# Patient Record
Sex: FEMALE | Race: BLACK OR AFRICAN AMERICAN | NOT HISPANIC OR LATINO | URBAN - METROPOLITAN AREA
[De-identification: names, ages, dates, MRNs, and addresses within clinical notes are randomized per-mention and may not be internally consistent; named-entity substitution may affect disease eponyms.]

---

## 2024-03-13 ENCOUNTER — OFFICE VISIT (OUTPATIENT)
Dept: OBSTETRICS AND GYNECOLOGY | Facility: HOSPITAL | Age: 29
End: 2024-03-13
Payer: COMMERCIAL

## 2024-03-13 VITALS
WEIGHT: 153 LBS | HEIGHT: 64 IN | SYSTOLIC BLOOD PRESSURE: 118 MMHG | BODY MASS INDEX: 26.12 KG/M2 | DIASTOLIC BLOOD PRESSURE: 79 MMHG

## 2024-03-13 DIAGNOSIS — Z30.011 ENCOUNTER FOR INITIAL PRESCRIPTION OF CONTRACEPTIVE PILLS: Primary | ICD-10-CM

## 2024-03-13 DIAGNOSIS — Z01.419 WELL WOMAN EXAM: ICD-10-CM

## 2024-03-13 PROCEDURE — 1036F TOBACCO NON-USER: CPT | Performed by: NURSE PRACTITIONER

## 2024-03-13 PROCEDURE — 99395 PREV VISIT EST AGE 18-39: CPT | Performed by: NURSE PRACTITIONER

## 2024-03-13 PROCEDURE — 88175 CYTOPATH C/V AUTO FLUID REDO: CPT | Mod: TC,GCY | Performed by: NURSE PRACTITIONER

## 2024-03-13 PROCEDURE — 88141 CYTOPATH C/V INTERPRET: CPT | Performed by: STUDENT IN AN ORGANIZED HEALTH CARE EDUCATION/TRAINING PROGRAM

## 2024-03-13 RX ORDER — DESOGESTREL AND ETHINYL ESTRADIOL 0.15-0.03
1 KIT ORAL DAILY
Qty: 28 TABLET | Refills: 3 | Status: SHIPPED | OUTPATIENT
Start: 2024-03-13 | End: 2025-03-13

## 2024-03-13 ASSESSMENT — PATIENT HEALTH QUESTIONNAIRE - PHQ9
SUM OF ALL RESPONSES TO PHQ9 QUESTIONS 1 AND 2: 0
1. LITTLE INTEREST OR PLEASURE IN DOING THINGS: NOT AT ALL
2. FEELING DOWN, DEPRESSED OR HOPELESS: NOT AT ALL

## 2024-03-13 ASSESSMENT — ENCOUNTER SYMPTOMS
ENDOCRINE NEGATIVE: 1
HEMATOLOGIC/LYMPHATIC NEGATIVE: 1
CARDIOVASCULAR NEGATIVE: 1
PSYCHIATRIC NEGATIVE: 1
EYES NEGATIVE: 1
NEUROLOGICAL NEGATIVE: 1
ALLERGIC/IMMUNOLOGIC NEGATIVE: 1
GASTROINTESTINAL NEGATIVE: 1
RESPIRATORY NEGATIVE: 1
MUSCULOSKELETAL NEGATIVE: 1
CONSTITUTIONAL NEGATIVE: 1

## 2024-03-13 ASSESSMENT — PAIN SCALES - GENERAL: PAINLEVEL: 0-NO PAIN

## 2024-03-13 NOTE — PROGRESS NOTES
Ansley Nichols, APRN-CNP     Subjective   Ginny Lerma is a 28 y.o. female who presents for annual exam.   Patient is a 28-year-old G0, P0 here today for her annual checkup and to discuss birth control options.  History was reviewed and updated.    Patient was here a year ago discussing options for birth control.  At that time birth control pills were ordered but patient never took them as she was fearful of the side effects.  At this time side effects were again reviewed with the patient including protection against ovarian cancer, regulation of menstrual cycles improvement of skin.  Patient is a non-smoker under the age of 35 without a personal history of blood clots or migraines with aura.  She is normotensive.    At this time she is willing to start the birth control pills on  as she is currently on her menstrual period she and her partner have been using the withdrawal method and are anxious for better protection.  Patient is agreeable to come back after 3 months for blood pressure check and compliance check.  History reviewed. No pertinent past medical history.  Past Surgical History:   Procedure Laterality Date    BREAST LUMPECTOMY Left     benign    TONSILECTOMY, ADENOIDECTOMY, BILATERAL MYRINGOTOMY AND TUBES         OB History          0    Para   0    Term   0       0    AB   0    Living   0         SAB   0    IAB   0    Ectopic   0    Multiple   0    Live Births   0               Patient's last menstrual period was 03/10/2024 (approximate).      Review of Systems   Constitutional: Negative.    HENT: Negative.     Eyes: Negative.    Respiratory: Negative.     Cardiovascular: Negative.    Gastrointestinal: Negative.    Endocrine: Negative.    Genitourinary: Negative.    Musculoskeletal: Negative.    Skin: Negative.    Allergic/Immunologic: Negative.    Neurological: Negative.    Hematological: Negative.    Psychiatric/Behavioral: Negative.     All other systems reviewed and are  "negative.    Breast: No Complaints   Vaginal: No Complaints        Objective   /79 (BP Location: Right arm)   Ht 1.626 m (5' 4\")   Wt 69.4 kg (153 lb)   LMP 03/10/2024 (Approximate)   BMI 26.26 kg/m²   Physical Exam  Genitourinary:      Right Labia: No tenderness or lesions.     Left Labia: No tenderness or lesions.     No vaginal discharge or tenderness.      No vaginal prolapse present.     No vaginal atrophy present.     No cervical motion tenderness.      Uterus is not enlarged.      Uterus is anteverted.   Breasts:     Right: Normal.      Left: Normal.                 Assessment/Plan   Problem List Items Addressed This Visit    None  Visit Diagnoses         Codes    Encounter for initial prescription of contraceptive pills    -  Primary Z30.011    Relevant Medications    desogestreL-ethinyl estradioL (Apri) 0.15-0.03 mg tablet    Other Relevant Orders    THINPREP PAP TEST    Follow Up In Gynecology    Well woman exam     Z01.419          Return in 3 months for blood pressure check      "

## 2024-03-15 ENCOUNTER — APPOINTMENT (OUTPATIENT)
Dept: PRIMARY CARE | Facility: CLINIC | Age: 29
End: 2024-03-15
Payer: COMMERCIAL

## 2024-03-25 LAB
CYTOLOGY CMNT CVX/VAG CYTO-IMP: NORMAL
LAB AP HPV GENOTYPE QUESTION: YES
LAB AP HPV HR: NORMAL
LABORATORY COMMENT REPORT: NORMAL
PATH REPORT.TOTAL CANCER: NORMAL

## 2024-06-06 ENCOUNTER — HOSPITAL ENCOUNTER (EMERGENCY)
Facility: HOSPITAL | Age: 29
Discharge: HOME | End: 2024-06-06
Attending: EMERGENCY MEDICINE
Payer: COMMERCIAL

## 2024-06-06 VITALS
RESPIRATION RATE: 15 BRPM | HEIGHT: 64 IN | DIASTOLIC BLOOD PRESSURE: 67 MMHG | WEIGHT: 153 LBS | SYSTOLIC BLOOD PRESSURE: 125 MMHG | BODY MASS INDEX: 26.12 KG/M2 | HEART RATE: 78 BPM | OXYGEN SATURATION: 99 %

## 2024-06-06 DIAGNOSIS — T78.40XA ALLERGIC REACTION TO DRUG, INITIAL ENCOUNTER: Primary | ICD-10-CM

## 2024-06-06 LAB
ANION GAP BLDV CALCULATED.4IONS-SCNC: 10 MMOL/L (ref 10–25)
BASE EXCESS BLDV CALC-SCNC: 0.8 MMOL/L (ref -2–3)
BODY TEMPERATURE: 37 DEGREES CELSIUS
CA-I BLDV-SCNC: 1.26 MMOL/L (ref 1.1–1.33)
CHLORIDE BLDV-SCNC: 105 MMOL/L (ref 98–107)
FLUAV RNA RESP QL NAA+PROBE: NOT DETECTED
FLUBV RNA RESP QL NAA+PROBE: DETECTED
GLUCOSE BLDV-MCNC: 87 MG/DL (ref 74–99)
HCO3 BLDV-SCNC: 27.4 MMOL/L (ref 22–26)
HCT VFR BLD EST: 34 % (ref 36–46)
HGB BLDV-MCNC: 11.2 G/DL (ref 12–16)
LACTATE BLDV-SCNC: 0.7 MMOL/L (ref 0.4–2)
OXYHGB MFR BLDV: 37.8 % (ref 45–75)
PCO2 BLDV: 52 MM HG (ref 41–51)
PH BLDV: 7.33 PH (ref 7.33–7.43)
PO2 BLDV: 32 MM HG (ref 35–45)
POTASSIUM BLDV-SCNC: 3.8 MMOL/L (ref 3.5–5.3)
PREGNANCY TEST URINE, POC: NEGATIVE
SAO2 % BLDV: 38 % (ref 45–75)
SARS-COV-2 RNA RESP QL NAA+PROBE: NOT DETECTED
SODIUM BLDV-SCNC: 139 MMOL/L (ref 136–145)

## 2024-06-06 PROCEDURE — 2500000001 HC RX 250 WO HCPCS SELF ADMINISTERED DRUGS (ALT 637 FOR MEDICARE OP): Performed by: EMERGENCY MEDICINE

## 2024-06-06 PROCEDURE — 87636 SARSCOV2 & INF A&B AMP PRB: CPT | Performed by: STUDENT IN AN ORGANIZED HEALTH CARE EDUCATION/TRAINING PROGRAM

## 2024-06-06 PROCEDURE — 99291 CRITICAL CARE FIRST HOUR: CPT | Mod: 25,27 | Performed by: EMERGENCY MEDICINE

## 2024-06-06 PROCEDURE — 99291 CRITICAL CARE FIRST HOUR: CPT | Performed by: EMERGENCY MEDICINE

## 2024-06-06 PROCEDURE — 84132 ASSAY OF SERUM POTASSIUM: CPT

## 2024-06-06 PROCEDURE — 96372 THER/PROPH/DIAG INJ SC/IM: CPT

## 2024-06-06 PROCEDURE — 96374 THER/PROPH/DIAG INJ IV PUSH: CPT

## 2024-06-06 PROCEDURE — 2500000004 HC RX 250 GENERAL PHARMACY W/ HCPCS (ALT 636 FOR OP/ED)

## 2024-06-06 PROCEDURE — 99284 EMERGENCY DEPT VISIT MOD MDM: CPT | Mod: 25

## 2024-06-06 PROCEDURE — 96375 TX/PRO/DX INJ NEW DRUG ADDON: CPT

## 2024-06-06 PROCEDURE — 81025 URINE PREGNANCY TEST: CPT | Performed by: STUDENT IN AN ORGANIZED HEALTH CARE EDUCATION/TRAINING PROGRAM

## 2024-06-06 RX ORDER — OXYMETAZOLINE HCL 0.05 %
2 SPRAY, NON-AEROSOL (ML) NASAL ONCE
Status: COMPLETED | OUTPATIENT
Start: 2024-06-06 | End: 2024-06-06

## 2024-06-06 RX ORDER — KETAMINE HYDROCHLORIDE 100 MG/ML
INJECTION, SOLUTION INTRAMUSCULAR; INTRAVENOUS
Status: DISCONTINUED
Start: 2024-06-06 | End: 2024-06-06 | Stop reason: HOSPADM

## 2024-06-06 RX ORDER — DIPHENHYDRAMINE HYDROCHLORIDE 50 MG/ML
50 INJECTION INTRAMUSCULAR; INTRAVENOUS ONCE
Status: COMPLETED | OUTPATIENT
Start: 2024-06-06 | End: 2024-06-06

## 2024-06-06 RX ORDER — LIDOCAINE HYDROCHLORIDE 40 MG/ML
1 SOLUTION TOPICAL ONCE
Status: COMPLETED | OUTPATIENT
Start: 2024-06-06 | End: 2024-06-06

## 2024-06-06 RX ORDER — ROCURONIUM BROMIDE 10 MG/ML
INJECTION, SOLUTION INTRAVENOUS
Status: DISCONTINUED
Start: 2024-06-06 | End: 2024-06-06 | Stop reason: HOSPADM

## 2024-06-06 RX ORDER — FAMOTIDINE 10 MG/ML
INJECTION INTRAVENOUS
Status: COMPLETED
Start: 2024-06-06 | End: 2024-06-06

## 2024-06-06 RX ORDER — DIPHENHYDRAMINE HCL 25 MG
25 CAPSULE ORAL EVERY 8 HOURS PRN
Qty: 15 CAPSULE | Refills: 0 | Status: SHIPPED | OUTPATIENT
Start: 2024-06-06

## 2024-06-06 RX ORDER — EPINEPHRINE 0.3 MG/.3ML
1 INJECTION SUBCUTANEOUS ONCE AS NEEDED
Qty: 2 EACH | Refills: 1 | Status: SHIPPED | OUTPATIENT
Start: 2024-06-06

## 2024-06-06 RX ORDER — EPINEPHRINE 1 MG/ML
INJECTION, SOLUTION, CONCENTRATE INTRAVENOUS
Status: COMPLETED
Start: 2024-06-06 | End: 2024-06-06

## 2024-06-06 RX ORDER — OXYMETAZOLINE HCL 0.05 %
SPRAY, NON-AEROSOL (ML) NASAL
Status: COMPLETED
Start: 2024-06-06 | End: 2024-06-06

## 2024-06-06 RX ORDER — DIPHENHYDRAMINE HYDROCHLORIDE 50 MG/ML
INJECTION INTRAMUSCULAR; INTRAVENOUS
Status: COMPLETED
Start: 2024-06-06 | End: 2024-06-06

## 2024-06-06 RX ORDER — FAMOTIDINE 20 MG/1
20 TABLET, FILM COATED ORAL 2 TIMES DAILY
Qty: 30 TABLET | Refills: 0 | Status: SHIPPED | OUTPATIENT
Start: 2024-06-06 | End: 2024-06-21

## 2024-06-06 RX ORDER — LIDOCAINE HYDROCHLORIDE 40 MG/ML
SOLUTION TOPICAL
Status: COMPLETED
Start: 2024-06-06 | End: 2024-06-06

## 2024-06-06 RX ORDER — EPINEPHRINE 1 MG/ML
0.3 INJECTION, SOLUTION, CONCENTRATE INTRAVENOUS ONCE
Status: COMPLETED | OUTPATIENT
Start: 2024-06-06 | End: 2024-06-06

## 2024-06-06 RX ORDER — FAMOTIDINE 10 MG/ML
20 INJECTION INTRAVENOUS ONCE
Status: COMPLETED | OUTPATIENT
Start: 2024-06-06 | End: 2024-06-06

## 2024-06-06 RX ADMIN — EPINEPHRINE 0.3 MG: 1 INJECTION, SOLUTION, CONCENTRATE INTRAVENOUS at 15:01

## 2024-06-06 RX ADMIN — LIDOCAINE HYDROCHLORIDE 1 KIT: 40 SOLUTION TOPICAL at 15:05

## 2024-06-06 RX ADMIN — DIPHENHYDRAMINE HYDROCHLORIDE 50 MG: 50 INJECTION INTRAMUSCULAR; INTRAVENOUS at 15:02

## 2024-06-06 RX ADMIN — DIPHENHYDRAMINE HYDROCHLORIDE 50 MG: 50 INJECTION, SOLUTION INTRAMUSCULAR; INTRAVENOUS at 15:02

## 2024-06-06 RX ADMIN — METHYLPREDNISOLONE SODIUM SUCCINATE 125 MG: 125 INJECTION, POWDER, FOR SOLUTION INTRAMUSCULAR; INTRAVENOUS at 15:01

## 2024-06-06 RX ADMIN — Medication 2 SPRAY: at 15:32

## 2024-06-06 RX ADMIN — FAMOTIDINE 20 MG: 10 INJECTION INTRAVENOUS at 15:02

## 2024-06-06 ASSESSMENT — LIFESTYLE VARIABLES
EVER HAD A DRINK FIRST THING IN THE MORNING TO STEADY YOUR NERVES TO GET RID OF A HANGOVER: NO
TOTAL SCORE: 0
EVER FELT BAD OR GUILTY ABOUT YOUR DRINKING: NO
HAVE YOU EVER FELT YOU SHOULD CUT DOWN ON YOUR DRINKING: NO
HAVE PEOPLE ANNOYED YOU BY CRITICIZING YOUR DRINKING: NO

## 2024-06-06 ASSESSMENT — PAIN - FUNCTIONAL ASSESSMENT: PAIN_FUNCTIONAL_ASSESSMENT: 0-10

## 2024-06-06 ASSESSMENT — COLUMBIA-SUICIDE SEVERITY RATING SCALE - C-SSRS
6. HAVE YOU EVER DONE ANYTHING, STARTED TO DO ANYTHING, OR PREPARED TO DO ANYTHING TO END YOUR LIFE?: NO
2. HAVE YOU ACTUALLY HAD ANY THOUGHTS OF KILLING YOURSELF?: NO
1. IN THE PAST MONTH, HAVE YOU WISHED YOU WERE DEAD OR WISHED YOU COULD GO TO SLEEP AND NOT WAKE UP?: NO

## 2024-06-06 ASSESSMENT — PAIN SCALES - GENERAL: PAINLEVEL_OUTOF10: 0 - NO PAIN

## 2024-06-06 NOTE — ED TRIAGE NOTES
Patient came to ED with c/o allergic reaction to toradol. Patient states she did not know med was an NSAID when she took it. Patient has had anaphylactic reaction before and has used EPIpen in past. Patient did not take epipen today. Patient took med 30 mins prior to arrival

## 2024-06-06 NOTE — Clinical Note
Ginny Lerma was seen and treated in our emergency department on 6/6/2024.  She may return to work on 06/10/2024.       If you have any questions or concerns, please don't hesitate to call.      Sera Samuel MD

## 2024-06-06 NOTE — ED PROVIDER NOTES
CC: Allergic Reaction     HPI:  Patient is a 28-year-old female presenting to the ED with concern for allergic reaction.  Patient states she has an NSAID allergy and began having symptoms after she took Toradol today.  Patient states she does not know Toradol was an NSAID.  States that her significant other was just diagnosed with influenza B and she believes she had bodyaches from that therefore took 1 over the oral Toradol.  Patient states she has used an EpiPen in the past but did not have one to use today.  Took the med 30 minutes prior to arrival.  Is endorsing facial swelling.    Limitations to History: None    Additional History Obtained from: Family     Records Reviewed: Recent available ED and inpatient notes reviewed in EMR.    PMHx/PSHx:  Per HPI.   - has no past medical history on file.  - has a past surgical history that includes Breast lumpectomy (Left) and Tonsilectomy, adenoidectomy, bilateral myringotomy and tubes.    Medications: Reviewed in EMR. See EMR for complete list of medications and doses.    Allergies:  Nsaids (non-steroidal anti-inflammatory drug)    Social History:  - Tobacco:  reports that she has never smoked. She has never used smokeless tobacco.   - Alcohol:  reports current alcohol use.   - Illicit Drugs:  reports no history of drug use.   ???????????????????????????????????????????????????????????????  Triage Vitals:  T    HR 96  BP (!) 128/99  RR 18  O2 100 % None (Room air)    PHYSICAL EXAM:   VS: As documented in the triage note and EMR flowsheet from this visit were reviewed.  Gen: uncomfortable appearing female, NAD  Eyes: Tearing with some mild swelling around bilateral eyes  HENT: Mucous membranes moist, posterior oropharynx without swelling, uvula midline, tongue without swelling  Resp: Non-labored breathing on RA, CTAB, no wheezes or crackles  CV: RRR, nl S1, S2  Abd: Soft, non-distended, non-tender, no rebound or guarding  Ext: no LE edema, pulses full and  equal  Skin: WWP. No systemic rashes or lesions.  Neuro:  AAOx3, speech fluent, MAEx4, no focal deficit  Psych:  Appropriate mood and affect  ???????????????????????????????????????????????????????????????    ED Labs/Imaging:   Labs Reviewed   INFLUENZA A AND B PCR - Abnormal       Result Value    Flu A Result Not Detected      Flu B Result Detected (*)     Narrative:     This assay is an in vitro diagnostic multiplex nucleic acid amplification test for the detection and discrimination of Influenza A & B from nasopharyngeal specimens, and has been validated for use at [facility]. Negative results do not preclude Influenza A/B infections, and should not be used as the sole basis for diagnosis, treatment, or other management decisions. If Influenza A/B and RSV PCR results are negative, testing for Parainfluenza virus, Adenovirus and Metapneumovirus is routinely performed for [facility] pediatric oncology and intensive care inpatients, and is available on other patients by placing an add-on request.   BLOOD GAS VENOUS FULL PANEL UNSOLICITED - Abnormal    POCT pH, Venous 7.33      POCT pCO2, Venous 52 (*)     POCT pO2, Venous 32 (*)     POCT SO2, Venous 38 (*)     POCT Oxy Hemoglobin, Venous 37.8 (*)     POCT Hematocrit Calculated, Venous 34.0 (*)     POCT Sodium, Venous 139      POCT Potassium, Venous 3.8      POCT Chloride, Venous 105      POCT Ionized Calicum, Venous 1.26      POCT Glucose, Venous 87      POCT Lactate, Venous 0.7      POCT Base Excess, Venous 0.8      POCT HCO3 Calculated, Venous 27.4 (*)     POCT Hemoglobin, Venous 11.2 (*)     POCT Anion Gap, Venous 10.0      Patient Temperature 37.0     SARS-COV-2 PCR - Normal    Coronavirus 2019, PCR Not Detected      Narrative:     This assay has received FDA Emergency Use Authorization (EUA) and is only authorized for the duration of time that circumstances exist to justify the authorization of the emergency use of in vitro diagnostic tests for  the detection of SARS-CoV-2 virus and/or diagnosis of COVID-19 infection under section 564(b)(1) of the Act, 21 U.S.C. 360bbb-3(b)(1). This assay is an in vitro diagnostic nucleic acid amplification test for the qualitative detection of SARS-CoV-2 from nasopharyngeal specimens and has been validated for use at [facility]. Negative results do not preclude COVID-19 infections and should not be used as the sole basis for diagnosis, treatment, or other management decisions.     POCT PREGNANCY, URINE - Normal    Preg Test, Ur Negative       No orders to display       Medical Decision Making:  ED Course & MDM   Diagnoses as of 06/07/24 2034   Allergic reaction to drug, initial encounter     This is a 28-year-old female with history of NSAID allergy presenting to the ED due to concern for allergic reaction after accidentally ingesting Toradol.  Patient activated as a critical.  On exam patient has some skin changes on her face but no active respiratory distress.  Given patient states that she has had airway swelling in the past, treated as severe allergic reaction/anaphylaxis and patient administered IM epi, Solu-Medrol, Pepcid, and dexamethasone.  During exam, patient did feel like she was having more difficulty breathing.  At that time patient was NP scoped to evaluate and patient had no epiglottis or cords/arytenoid edema.  Patient was then monitored for greater than 4 hours and had no recurrence or worsening of her symptoms.  Patient will be sent home with EpiPen prescription and strict return precautions.  Patient also was tested for influenza given her positive contact and tested positive.  Patient stable from that standpoint with minimal symptoms.  Declined Tamiflu but this was offered.  Patient was agreeable to plan was discharged in stable condition.      Social Determinants Limiting Care:  None identified    Disposition:  As a result of the work-up, patient was discharged home.  They were  informed of their diagnosis and instructed to come back with any concerns or worsening of condition and was agreeable to the plan as discussed above.  The patient was given the opportunity to ask questions.  All of the patient's questions were answered.  The patient remained stable under my care.    Patient seen and discussed with attending physician.    Sera Samuel MD PGY3  Emergency Medicine      Critical Care    Performed by: Cuco Gaming MD  Authorized by: Katy Carver MD    Critical care provider statement:     Critical care time (minutes):  35    Critical care time was exclusive of:  Separately billable procedures and treating other patients and teaching time    Critical care was necessary to treat or prevent imminent or life-threatening deterioration of the following conditions: airway compromise and anaphylaxis.    Critical care was time spent personally by me on the following activities:  Evaluation of patient's response to treatment, examination of patient, re-evaluation of patient's condition, pulse oximetry, ordering and review of radiographic studies and ordering and review of laboratory studies   ? [employer] last updated 6/7/2024 8:34 PM        Sera Samuel MD  Resident  06/07/24 2034    I saw and evaluated the patient. I personally obtained the key and critical portions of the history and physical exam or was physically present for key and critical portions performed by the resident/fellow/LAURENT. I reviewed the resident/fellow/LAURENT's documentation and discussed the patient with the resident/fellow/LAURENT. I agree with the resident/fellow/LAURENT's medical decision making as documented in the note.    ** Please excuse any errors in grammar or translation related to this dictation. Voice recognition software was utilized to prepare this document. **       Cuco Gaming MD  [facility] Emergency Medicine          Cuco Gaming MD  06/10/24 1155

## 2024-07-19 ENCOUNTER — OFFICE VISIT (OUTPATIENT)
Dept: OBSTETRICS AND GYNECOLOGY | Facility: HOSPITAL | Age: 29
End: 2024-07-19
Payer: COMMERCIAL

## 2024-07-19 VITALS — DIASTOLIC BLOOD PRESSURE: 77 MMHG | BODY MASS INDEX: 26.02 KG/M2 | SYSTOLIC BLOOD PRESSURE: 117 MMHG | WEIGHT: 151.6 LBS

## 2024-07-19 DIAGNOSIS — Z30.41 ENCOUNTER FOR SURVEILLANCE OF CONTRACEPTIVE PILLS: Primary | ICD-10-CM

## 2024-07-19 PROCEDURE — 99212 OFFICE O/P EST SF 10 MIN: CPT | Performed by: NURSE PRACTITIONER

## 2024-07-19 RX ORDER — NORETHINDRONE ACETATE AND ETHINYL ESTRADIOL 1MG-20(21)
1 KIT ORAL DAILY
Qty: 28 TABLET | Refills: 11 | Status: SHIPPED | OUTPATIENT
Start: 2024-07-19 | End: 2025-07-19

## 2024-07-19 ASSESSMENT — PAIN SCALES - GENERAL: PAINLEVEL: 0-NO PAIN

## 2024-07-19 NOTE — PROGRESS NOTES
Subjective   Patient ID: Ginny Lerma is a 28 y.o. female who presents for Birth control follow up (Pt here for birth control follow up, pt reports it is working well for her except she has noticed decreased libido/LMP 7/3/2024).  HPI  C/o decreased libido and inability to orgasm since taking the pill.  Nothing else has changed. this is distressing to her.  She likes all other aspects of the pill and eric like to try another brand  Review of Systems   Genitourinary:         Sexual problems   All other systems reviewed and are negative.      Objective   Physical Exam deferred  Visit spent in discussion    Assessment/Plan   Problem List Items Addressed This Visit    None  Visit Diagnoses         Codes    Encounter for surveillance of contraceptive pills    -  Primary Z30.41    Relevant Medications    norethindrone-e.estradioL-iron (Junel FE 1/20) 1 mg-20 mcg (21)/75 mg (7) tablet                 ELOISA Kenny-CNP 07/19/24 12:42 PM

## 2024-09-25 ENCOUNTER — LAB (OUTPATIENT)
Dept: LAB | Facility: LAB | Age: 29
End: 2024-09-25
Payer: COMMERCIAL

## 2024-09-26 LAB — COTININE UR QL SCN: NEGATIVE

## 2024-11-18 ENCOUNTER — TELEPHONE (OUTPATIENT)
Dept: OBSTETRICS AND GYNECOLOGY | Facility: HOSPITAL | Age: 29
End: 2024-11-18
Payer: COMMERCIAL

## 2024-11-18 ENCOUNTER — PATIENT MESSAGE (OUTPATIENT)
Dept: OBSTETRICS AND GYNECOLOGY | Facility: HOSPITAL | Age: 29
End: 2024-11-18
Payer: COMMERCIAL

## 2024-11-18 DIAGNOSIS — Z30.011 ENCOUNTER FOR INITIAL PRESCRIPTION OF CONTRACEPTIVE PILLS: ICD-10-CM

## 2024-11-18 RX ORDER — DESOGESTREL AND ETHINYL ESTRADIOL 0.15-0.03
1 KIT ORAL DAILY
Qty: 84 TABLET | Refills: 1 | Status: SHIPPED | OUTPATIENT
Start: 2024-11-18

## 2025-05-17 DIAGNOSIS — Z30.011 ENCOUNTER FOR INITIAL PRESCRIPTION OF CONTRACEPTIVE PILLS: ICD-10-CM

## 2025-05-22 RX ORDER — DESOGESTREL AND ETHINYL ESTRADIOL 0.15-0.03
1 KIT ORAL DAILY
Qty: 84 TABLET | Refills: 0 | Status: SHIPPED | OUTPATIENT
Start: 2025-05-22

## 2025-05-30 ENCOUNTER — APPOINTMENT (OUTPATIENT)
Dept: PRIMARY CARE | Facility: CLINIC | Age: 30
End: 2025-05-30
Payer: COMMERCIAL

## 2025-05-30 VITALS
WEIGHT: 140 LBS | BODY MASS INDEX: 23.9 KG/M2 | OXYGEN SATURATION: 98 % | HEIGHT: 64 IN | HEART RATE: 61 BPM | SYSTOLIC BLOOD PRESSURE: 110 MMHG | DIASTOLIC BLOOD PRESSURE: 70 MMHG

## 2025-05-30 DIAGNOSIS — G89.29 CHRONIC PAIN OF BOTH SHOULDERS: ICD-10-CM

## 2025-05-30 DIAGNOSIS — Z13.220 LIPID SCREENING: ICD-10-CM

## 2025-05-30 DIAGNOSIS — M25.511 CHRONIC PAIN OF BOTH SHOULDERS: ICD-10-CM

## 2025-05-30 DIAGNOSIS — M54.9 UPPER BACK PAIN: ICD-10-CM

## 2025-05-30 DIAGNOSIS — D64.9 ANEMIA, UNSPECIFIED TYPE: ICD-10-CM

## 2025-05-30 DIAGNOSIS — N64.89 BILATERAL PENDULOUS BREASTS: ICD-10-CM

## 2025-05-30 DIAGNOSIS — M25.512 CHRONIC PAIN OF BOTH SHOULDERS: ICD-10-CM

## 2025-05-30 DIAGNOSIS — Z13.29 THYROID DISORDER SCREENING: ICD-10-CM

## 2025-05-30 DIAGNOSIS — Z00.00 ANNUAL PHYSICAL EXAM: Primary | ICD-10-CM

## 2025-05-30 PROCEDURE — 99395 PREV VISIT EST AGE 18-39: CPT | Performed by: STUDENT IN AN ORGANIZED HEALTH CARE EDUCATION/TRAINING PROGRAM

## 2025-05-30 PROCEDURE — 1036F TOBACCO NON-USER: CPT | Performed by: STUDENT IN AN ORGANIZED HEALTH CARE EDUCATION/TRAINING PROGRAM

## 2025-05-30 PROCEDURE — 3008F BODY MASS INDEX DOCD: CPT | Performed by: STUDENT IN AN ORGANIZED HEALTH CARE EDUCATION/TRAINING PROGRAM

## 2025-05-30 ASSESSMENT — PATIENT HEALTH QUESTIONNAIRE - PHQ9
1. LITTLE INTEREST OR PLEASURE IN DOING THINGS: NOT AT ALL
SUM OF ALL RESPONSES TO PHQ9 QUESTIONS 1 AND 2: 0
2. FEELING DOWN, DEPRESSED OR HOPELESS: NOT AT ALL

## 2025-05-30 NOTE — PROGRESS NOTES
Subjective   Patient ID: Ginny Lerma is a 29 y.o. female who presents for Annual Exam (Physical /Would like to talk about a referral to plastic surgery-Would like to talk about a breast reduction as she gets a lot of shoulder and back pain and also rashes/ blisters due to breast size/Fasting blood work and iron check).  History of Present Illness  The patient presents for evaluation of potential breast reduction surgery, mild anemia, contraception management, and left-sided sharp pain.    She has been experiencing chronic back and neck pain, which she attributes to her large breasts. This discomfort is exacerbated by poor posture and physical activities such as running, leading to under-breast pain and severe breast tenderness. She recently initiated oral contraceptive use for pregnancy prevention, which she believes has intensified her breast pain and increased their size. She is seeking information on insurance coverage for breast reduction surgery. She reports frequent rashes under her breasts, which have improved with the application of deodorant. Despite a weight loss of 10 to 15 pounds over the past few months, her breast size remains unchanged. She wears a 34DDD bra and experiences shoulder discomfort due to small bra straps. She has incorporated deadlifts into her exercise routine to strengthen her back and improve her posture.    She expresses concern about potential fertility issues, as she is in a serious relationship but does not plan for pregnancy in the near future. She is considering egg freezing closer to age 32. She is contemplating switching to a non-hormonal IUD due to concerns about long-term cancer risks associated with hormonal contraceptives. Her menstrual cycles are regular.    She reports good sleep quality, which has improved since she began running in 10/2024. She experiences a high heart rate during exercise, reaching up to 180 beats per minute, but attributes this to overexertion. She is  "currently training for a half marathon and is working on managing her heart rate. She reports no urinary symptoms or abdominal pain. She occasionally experiences sharp, transient pain on her left side, which she suspects may be related to exercise. She reports no ear pain or pressure but notes a throbbing sensation in her ears at night, which she believes is due to loud music exposure. She reports no headaches or blurry vision.    She was informed of a potential iron deficiency in 2023 and wishes to have her iron levels checked today. She does not take any multivitamins or supplements and has not started prenatal vitamins.    SOCIAL HISTORY  She works in [employer] at .    FAMILY HISTORY  Her father has hypertension. No heart-related issues in the family.    Review of Systems  ROS otherwise negative aside from what was mentioned above in HPI.    Objective     /70 (BP Location: Left arm, Patient Position: Sitting, BP Cuff Size: Adult)   Pulse 61   Ht 1.626 m (5' 4\")   Wt 63.5 kg (140 lb)   SpO2 98%   BMI 24.03 kg/m²      Current Outpatient Medications   Medication Instructions    desogestrel-ethinyl estradiol (Apri) 0.15-0.03 mg tablet 1 tablet, oral, Daily    diphenhydrAMINE (BENADRYL) 25 mg, oral, Every 8 hours PRN    EPINEPHrine (EPIPEN) 0.3 mg, intramuscular, Once as needed, Inject into upper leg. Call 911 after use.    famotidine (PEPCID) 20 mg, oral, 2 times daily    norethindrone-e.estradioL-iron (Junel FE 1/20) 1 mg-20 mcg (21)/75 mg (7) tablet 1 tablet, oral, Daily       Physical Exam    Physical Exam  Vitals reviewed.   Constitutional:       General: She is not in acute distress.  HENT:      Head: Normocephalic.      Right Ear: External ear normal.      Left Ear: External ear normal.      Nose: Nose normal.   Eyes:      Extraocular Movements: Extraocular movements intact.      Pupils: Pupils are equal, round, and reactive to light.   Cardiovascular:      Rate and Rhythm: Normal rate and " regular rhythm.      Heart sounds: Normal heart sounds.   Pulmonary:      Effort: Pulmonary effort is normal.      Breath sounds: Normal breath sounds.   Abdominal:      Palpations: Abdomen is soft.      Tenderness: There is no abdominal tenderness. There is no guarding.   Musculoskeletal:         General: Normal range of motion.      Cervical back: Normal range of motion and neck supple.   Skin:     General: Skin is warm and dry.   Neurological:      Mental Status: She is alert. Mental status is at baseline.   Psychiatric:         Mood and Affect: Mood normal.         Behavior: Behavior normal.           Results  Labs   - Hemoglobin: 2023, Slightly low    Assessment & Plan  1. Potential breast reduction surgery.  - Chronic back and neck pain attributed to large breasts, exacerbated by poor posture and physical activities such as running.  - Severe breast tenderness and frequent rashes under the breasts, improved with deodorant application.  - Weight loss of 10 to 15 pounds over the past few months with no change in breast size; increased breast size and pain with recent initiation of oral contraceptive use.  - Referral to a plastic surgeon will be initiated to discuss the specific requirements for breast reduction surgery.    2. Mild anemia.  - Hemoglobin levels were slightly low during the last evaluation in 2023, indicating mild anemia, likely due to menstrual blood loss.  - Red blood cell size was normal.  - Complete blood count (CBC) and iron level test will be conducted today.  - No current symptoms of anemia reported.    3. Contraception management.  - Currently using birth control pills for pregnancy prevention but considering switching to a non-hormonal IUD due to concerns about hormonal side effects and cancer risk.  - Benefits and risks of both hormonal and non-hormonal IUDs were discussed, including the potential for heavier periods with the copper IUD.  - If she decides to explore other contraceptive  options, a consultation with her gynecologist will be arranged.    4. Left-sided sharp chest pain.  - Occasional sharp, transient pain on the left side, suspected to be related to exercise.  - Pain is short-lived and recurring, suggesting a muscular origin.  - No immediate intervention required unless symptoms worsen or become more frequent.  - No family history of heart disease reported; blood pressure is normal.    Health maintenance  -UTD on pap, due 2027      Velia Avila,      This medical note was created with the assistance of artificial intelligence (AI) for documentation purposes. The content has been reviewed and confirmed by the healthcare provider for accuracy and completeness. Patient consented to the use of audio recording and use of AI during their visit.

## 2025-05-31 LAB
ALBUMIN SERPL-MCNC: 4.1 G/DL (ref 3.6–5.1)
ALP SERPL-CCNC: 41 U/L (ref 31–125)
ALT SERPL-CCNC: 9 U/L (ref 6–29)
ANION GAP SERPL CALCULATED.4IONS-SCNC: 7 MMOL/L (CALC) (ref 7–17)
AST SERPL-CCNC: 17 U/L (ref 10–30)
BILIRUB SERPL-MCNC: 0.3 MG/DL (ref 0.2–1.2)
BUN SERPL-MCNC: 10 MG/DL (ref 7–25)
CALCIUM SERPL-MCNC: 9.3 MG/DL (ref 8.6–10.2)
CHLORIDE SERPL-SCNC: 102 MMOL/L (ref 98–110)
CHOLEST SERPL-MCNC: 210 MG/DL
CHOLEST/HDLC SERPL: 2.6 (CALC)
CO2 SERPL-SCNC: 27 MMOL/L (ref 20–32)
CREAT SERPL-MCNC: 0.96 MG/DL (ref 0.5–0.96)
EGFRCR SERPLBLD CKD-EPI 2021: 83 ML/MIN/1.73M2
ERYTHROCYTE [DISTWIDTH] IN BLOOD BY AUTOMATED COUNT: 12 % (ref 11–15)
EST. AVERAGE GLUCOSE BLD GHB EST-MCNC: 108 MG/DL
EST. AVERAGE GLUCOSE BLD GHB EST-SCNC: 6 MMOL/L
GLUCOSE SERPL-MCNC: 72 MG/DL (ref 65–99)
HBA1C MFR BLD: 5.4 %
HCT VFR BLD AUTO: 34.7 % (ref 35–45)
HDLC SERPL-MCNC: 82 MG/DL
HGB BLD-MCNC: 11.3 G/DL (ref 11.7–15.5)
IRON SATN MFR SERPL: 36 % (CALC) (ref 16–45)
IRON SERPL-MCNC: 157 MCG/DL (ref 40–190)
LDLC SERPL CALC-MCNC: 107 MG/DL (CALC)
MCH RBC QN AUTO: 30.2 PG (ref 27–33)
MCHC RBC AUTO-ENTMCNC: 32.6 G/DL (ref 32–36)
MCV RBC AUTO: 92.8 FL (ref 80–100)
NONHDLC SERPL-MCNC: 128 MG/DL (CALC)
PLATELET # BLD AUTO: 270 THOUSAND/UL (ref 140–400)
PMV BLD REES-ECKER: 9.8 FL (ref 7.5–12.5)
POTASSIUM SERPL-SCNC: 4.2 MMOL/L (ref 3.5–5.3)
PROT SERPL-MCNC: 7.2 G/DL (ref 6.1–8.1)
RBC # BLD AUTO: 3.74 MILLION/UL (ref 3.8–5.1)
SODIUM SERPL-SCNC: 136 MMOL/L (ref 135–146)
TIBC SERPL-MCNC: 434 MCG/DL (CALC) (ref 250–450)
TRIGL SERPL-MCNC: 117 MG/DL
TSH SERPL-ACNC: 4.06 MIU/L
WBC # BLD AUTO: 4.3 THOUSAND/UL (ref 3.8–10.8)

## 2025-06-25 ENCOUNTER — TELEPHONE (OUTPATIENT)
Dept: PLASTIC SURGERY | Facility: CLINIC | Age: 30
End: 2025-06-25
Payer: COMMERCIAL

## 2025-06-25 NOTE — TELEPHONE ENCOUNTER
Spoke with patient in regards to rescheduling appointment. Pt was agreeable to reschedule to July 14 at 8:30am.

## 2025-06-26 ENCOUNTER — APPOINTMENT (OUTPATIENT)
Dept: PLASTIC SURGERY | Facility: CLINIC | Age: 30
End: 2025-06-26
Payer: COMMERCIAL

## 2025-06-30 ENCOUNTER — APPOINTMENT (OUTPATIENT)
Dept: PLASTIC SURGERY | Facility: CLINIC | Age: 30
End: 2025-06-30
Payer: COMMERCIAL

## 2025-07-09 NOTE — PROGRESS NOTES
Plastic Surgery Clinic Visit  Breast Reduction    Patient Name: Ginny Lerma   MRN: [id number]   Date:  07/14/25     Subjective  Ginny Lerma is a 29 y.o. nonsmoking female with no significant medical history, who was referred by her PCP (CARLY Avila DO)  for evaluation of symptomatic macromastia. Pt states that she has experience upper thoracic back, shoulder and neck pain since puberty. Daily pain scale is 6-7/10 on pain scale. She has yet to complete physical therapy but does utilize weight lifting, running and tylenol for pain . She also complains of irritation and itchiness under her breast folds since puberty for which she has used OTC interventions with relief. She reports when she avoids certain activities she has control of her irritation symptoms. Worse in summer months.    Lab Results   Component Value Date    HGBA1C 5.4 05/30/2025        HPI  Ginny Lerma is a 29 y.o. female who complains of the following symptoms for at least 1 year.   Symptom Y (yes)/N (no)   Headaches yes   Neck Pain yes   Shoulder Pain yes   Upper Back Pain yes   Painful kyphosis as documented by Xray no   Pain/discomfort/ulceration from bra straps cutting into shoulders yes   Skin breakdown due to soft tissue infection from overlying breast tissue  yes       She has undergone the following conservative measures with no relief:     Treatment  Y (yes)/N (no)   Medically supervised weight loss program for at least 3 months n   Dietary modifications and aerobic exercise for at least 6 months  yes   Seen an orthopedic or spine surgeon for spinal pain  no   Used dermatologic therapy for ulcers or refractory skin infections  no   Used specialty bras no   Used NSAIDs for pain relief  yes   Participated in physical therapy no     She is a size DDD/G cup. She would ideally like to be a size open to appropriate size cup.    Body mass index is There is no height or weight on file to calculate BMI.  Body surface area is There is no height or weight on  file to calculate BSA.  Her weight has been stable for at least 6 months.   Her last mammogram was n/a.      Histories  Medical History[1]  Surgical History[2]  Family History[3]    She does have a family history of breast cancer     Social History[4]    She is not a current smoker.     Review of Systems  (-)=Negative,(+)=Positive  REVIEW OF SYSTEMS:    Constitutional:  +Headaches  Eyes:  negative  Ears:  negative  Nose/Sinuses:  negative  Mouth/Throat:  negative  Cardiovascular:  negative  Respiratory:  negative  Gastrointestinal:  negative  Genitourinary:  negative  Musculoskeletal:  +Neck pain, Upper back pain, Shoulder pain, Kyphosis  Integumentary:  + Intertrigo, ulceration  Neuro:  negative  Psych:  negative  Endocrine:  negative  Hem/Lymph:  negative  Allergy/Immunology:  negative    Physical Exam  (-)=Negative,(+)=Positive  There were no vitals taken for this visit.   General: alert, oriented times three, no apparent distress, appearing age appropriate  Skin: skin color, texture and turgor are normal  Head: normocephalic, no masses, lesions, tenderness or abnormalities  Eyes: anicteric sclera, pupils are equally round and reactive to light, extraocular movements are intact  Neck: neck supple, no adenopathy, normal size  Breasts: bilateral breast hypertrophy Grade III ptosis, no active intertrigo, no masses, lumps, or tenderness. There is no superior pole fullness with greater amount of skin vs breast tissue. + for trapezius hypertrophy. No shoulder grooving.   Neuro: unremarkable without focal findings  Extremities/Musculoskeletal: no cyanosis, no edema, intact times four    Assessment/Plan  Ginny Lerma is a 29 y.o. female with symptomatic macromastia who has failed conservative mgmt after at least 6 months of non-operative therapeutic measures.  There is a reasonable likelihood that her symptoms are primarily due to trapezius hypertrophy     - Patient is motivated for breast reduction for improvement of  shoulder and back pain  - Based on exam and breast simulation, a breast reduction is not the ideal option for improving shoulder,neck and back pain  - Recommend referral to PT for ongoing muscle tension and pain  - Educated patient on option of trapezius Botox injections for muscle tension relief   - 50-100u per muscle   - injections Q3-4 months  -Reviewed that if PT is not beneficial for improving muscle pain and discomfort to see local PCP for further imaging and workup  -Reviewed option of Rx Nystatin powder/cream for increased breast fold irritation during the summer months  -Reviewed that is she is unhappy with the appearance of her breasts that we can discuss cosmetic breast aug vs mastopexy in the future.   - All questions were answered to satisfaction    Daylin Muniz PA-C  Plastic and Reconstructive Surgery                 [1] No past medical history on file.  [2]   Past Surgical History:  Procedure Laterality Date    BREAST LUMPECTOMY Left     benign    TONSILECTOMY, ADENOIDECTOMY, BILATERAL MYRINGOTOMY AND TUBES     [3]   Family History  Problem Relation Name Age of Onset    Breast cancer Father's Sister     [4]   Social History  Socioeconomic History    Marital status: Single   Tobacco Use    Smoking status: Never    Smokeless tobacco: Never   Vaping Use    Vaping status: Never Used   Substance and Sexual Activity    Alcohol use: Yes     Comment: Socially    Drug use: Never    Sexual activity: Yes     Partners: Male

## 2025-07-14 ENCOUNTER — APPOINTMENT (OUTPATIENT)
Dept: PLASTIC SURGERY | Facility: CLINIC | Age: 30
End: 2025-07-14
Payer: COMMERCIAL

## 2025-07-14 VITALS
WEIGHT: 139 LBS | BODY MASS INDEX: 23.73 KG/M2 | DIASTOLIC BLOOD PRESSURE: 85 MMHG | SYSTOLIC BLOOD PRESSURE: 128 MMHG | HEART RATE: 108 BPM | HEIGHT: 64 IN

## 2025-07-14 DIAGNOSIS — M25.512 CHRONIC PAIN OF BOTH SHOULDERS: Primary | ICD-10-CM

## 2025-07-14 DIAGNOSIS — M25.511 CHRONIC PAIN OF BOTH SHOULDERS: Primary | ICD-10-CM

## 2025-07-14 DIAGNOSIS — N64.89 BILATERAL PENDULOUS BREASTS: ICD-10-CM

## 2025-07-14 DIAGNOSIS — G89.29 CHRONIC PAIN OF BOTH SHOULDERS: Primary | ICD-10-CM

## 2025-07-14 PROCEDURE — 99212 OFFICE O/P EST SF 10 MIN: CPT

## 2025-07-14 PROCEDURE — 3008F BODY MASS INDEX DOCD: CPT | Performed by: PHYSICIAN ASSISTANT

## 2025-07-14 ASSESSMENT — PAIN SCALES - GENERAL: PAINLEVEL_OUTOF10: 4
